# Patient Record
Sex: FEMALE | Race: WHITE | NOT HISPANIC OR LATINO
[De-identification: names, ages, dates, MRNs, and addresses within clinical notes are randomized per-mention and may not be internally consistent; named-entity substitution may affect disease eponyms.]

---

## 2017-02-19 ENCOUNTER — RESULT REVIEW (OUTPATIENT)
Age: 32
End: 2017-02-19

## 2017-02-21 ENCOUNTER — APPOINTMENT (OUTPATIENT)
Dept: FAMILY MEDICINE | Facility: CLINIC | Age: 32
End: 2017-02-21

## 2017-02-21 VITALS
HEIGHT: 66 IN | BODY MASS INDEX: 26.03 KG/M2 | DIASTOLIC BLOOD PRESSURE: 75 MMHG | HEART RATE: 82 BPM | TEMPERATURE: 98.1 F | SYSTOLIC BLOOD PRESSURE: 110 MMHG | WEIGHT: 162 LBS

## 2018-02-22 ENCOUNTER — LABORATORY RESULT (OUTPATIENT)
Age: 33
End: 2018-02-22

## 2018-02-22 ENCOUNTER — APPOINTMENT (OUTPATIENT)
Dept: FAMILY MEDICINE | Facility: CLINIC | Age: 33
End: 2018-02-22
Payer: COMMERCIAL

## 2018-02-22 VITALS
SYSTOLIC BLOOD PRESSURE: 104 MMHG | HEIGHT: 66 IN | BODY MASS INDEX: 25.71 KG/M2 | WEIGHT: 160 LBS | DIASTOLIC BLOOD PRESSURE: 70 MMHG | HEART RATE: 72 BPM

## 2018-02-22 DIAGNOSIS — Z92.29 PERSONAL HISTORY OF OTHER DRUG THERAPY: ICD-10-CM

## 2018-02-22 DIAGNOSIS — Z83.49 FAMILY HISTORY OF OTHER ENDOCRINE, NUTRITIONAL AND METABOLIC DISEASES: ICD-10-CM

## 2018-02-22 DIAGNOSIS — Z86.69 PERSONAL HISTORY OF OTHER DISEASES OF THE NERVOUS SYSTEM AND SENSE ORGANS: ICD-10-CM

## 2018-02-22 PROCEDURE — 36415 COLL VENOUS BLD VENIPUNCTURE: CPT

## 2018-02-22 PROCEDURE — 99395 PREV VISIT EST AGE 18-39: CPT | Mod: 25

## 2018-02-22 PROCEDURE — G0008: CPT

## 2018-02-22 PROCEDURE — 90686 IIV4 VACC NO PRSV 0.5 ML IM: CPT

## 2018-02-22 RX ORDER — POLYMYXIN B SULFATE AND TRIMETHOPRIM 10000; 1 [USP'U]/ML; MG/ML
10000-0.1 SOLUTION OPHTHALMIC
Qty: 1 | Refills: 0 | Status: DISCONTINUED | COMMUNITY
Start: 2017-02-21 | End: 2018-02-22

## 2018-03-06 ENCOUNTER — RESULT REVIEW (OUTPATIENT)
Age: 33
End: 2018-03-06

## 2018-04-03 ENCOUNTER — MESSAGE (OUTPATIENT)
Age: 33
End: 2018-04-03

## 2018-04-17 LAB — TM INTERPRETATION: NORMAL

## 2018-04-25 ENCOUNTER — APPOINTMENT (OUTPATIENT)
Dept: FAMILY MEDICINE | Facility: CLINIC | Age: 33
End: 2018-04-25
Payer: COMMERCIAL

## 2018-04-25 VITALS
WEIGHT: 161 LBS | HEIGHT: 66 IN | HEART RATE: 68 BPM | DIASTOLIC BLOOD PRESSURE: 70 MMHG | SYSTOLIC BLOOD PRESSURE: 124 MMHG | BODY MASS INDEX: 25.88 KG/M2

## 2018-04-25 DIAGNOSIS — E55.9 VITAMIN D DEFICIENCY, UNSPECIFIED: ICD-10-CM

## 2018-04-25 DIAGNOSIS — R79.89 OTHER SPECIFIED ABNORMAL FINDINGS OF BLOOD CHEMISTRY: ICD-10-CM

## 2018-04-25 PROCEDURE — 99213 OFFICE O/P EST LOW 20 MIN: CPT

## 2018-04-28 PROBLEM — E55.9 VITAMIN D INSUFFICIENCY: Status: ACTIVE | Noted: 2018-04-25

## 2018-12-17 ENCOUNTER — OTHER (OUTPATIENT)
Age: 33
End: 2018-12-17

## 2019-02-08 ENCOUNTER — APPOINTMENT (OUTPATIENT)
Dept: FAMILY MEDICINE | Facility: CLINIC | Age: 34
End: 2019-02-08
Payer: COMMERCIAL

## 2019-02-08 VITALS
WEIGHT: 156 LBS | HEIGHT: 66 IN | BODY MASS INDEX: 25.07 KG/M2 | OXYGEN SATURATION: 93 % | DIASTOLIC BLOOD PRESSURE: 70 MMHG | HEART RATE: 102 BPM | TEMPERATURE: 99.4 F | SYSTOLIC BLOOD PRESSURE: 124 MMHG

## 2019-02-08 DIAGNOSIS — Z20.828 CONTACT WITH AND (SUSPECTED) EXPOSURE TO OTHER VIRAL COMMUNICABLE DISEASES: ICD-10-CM

## 2019-02-08 DIAGNOSIS — Z92.29 PERSONAL HISTORY OF OTHER DRUG THERAPY: ICD-10-CM

## 2019-02-08 LAB
FLUAV SPEC QL CULT: NORMAL
FLUBV AG SPEC QL IA: NORMAL

## 2019-02-08 PROCEDURE — 99213 OFFICE O/P EST LOW 20 MIN: CPT | Mod: 25

## 2019-02-08 PROCEDURE — 87804 INFLUENZA ASSAY W/OPTIC: CPT | Mod: QW

## 2019-02-09 PROBLEM — Z20.828 EXPOSURE TO THE FLU: Status: RESOLVED | Noted: 2018-12-17 | Resolved: 2019-02-09

## 2019-02-09 PROBLEM — Z92.29 HISTORY OF INFLUENZA VACCINATION: Status: RESOLVED | Noted: 2018-02-22 | Resolved: 2019-02-09

## 2019-02-09 RX ORDER — OSELTAMIVIR PHOSPHATE 75 MG/1
75 CAPSULE ORAL
Qty: 10 | Refills: 0 | Status: DISCONTINUED | COMMUNITY
Start: 2018-12-17 | End: 2019-02-09

## 2019-02-09 NOTE — ADDENDUM
[FreeTextEntry1] : I, Gabby Zuniga, acted solely as a scribe for Dr. Denise Kennedy on this date 2/8/19.

## 2019-02-09 NOTE — HISTORY OF PRESENT ILLNESS
[FreeTextEntry8] : PATIENT PRESENTING FOR ACUTE VISIT COMPLAINING OF BODY ACHES, CHILLS, CONGESTION, AND COUGH. SYMPTOMS FIRST PRESENTED THIS MORNING. HAD AN ELEVATED TEMPERATURE. HAS NOT TAKEN ANY MEDICATIONS FOR SYMPTOMS. 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 NO VOMITING OR DIARRHEA. FEELING TIRED.  NO CHEST PAIN OR SHORTNESS OF BREATH.  NO OTHER COMPLAINTS TODAY.

## 2019-02-09 NOTE — ASSESSMENT
[FreeTextEntry1] : RAPID FLU: NEGATIVE\par STILL SUSPECT POSSIBLE FLU\par WILL TREAT WITH TAMIFLU - POTENTIAL SIDE EFFECTS REVIEWED\par SUPPORTIVE CARE: REST, FLUIDS, STEAM\par TYLENOL OR MOTRIN PRN AS DIRECTED\par CALL WITH ANY QUESTIONS, CONCERNS OR CHANGES \par DISCUSSED AS SYMPTOMS JUST STARTED, MAY NOT HAVE FULLY PRESENTED SELF AND TO CALL WITH ANY CONCERNS\par FOLLOW-UP FOR CPE AND LAB WORK
Home

## 2019-02-09 NOTE — PHYSICAL EXAM
[Supple] : supple [No Lymphadenopathy] : no lymphadenopathy [No Respiratory Distress] : no respiratory distress  [Clear to Auscultation] : lungs were clear to auscultation bilaterally [No Accessory Muscle Use] : no accessory muscle use [Normal Rate] : normal rate  [Regular Rhythm] : with a regular rhythm [Normal S1, S2] : normal S1 and S2 [No Murmur] : no murmur heard [No Acute Distress] : no acute distress [Well Nourished] : well nourished [Normal Outer Ear/Nose] : the outer ears and nose were normal in appearance [Normal Oropharynx] : the oropharynx was normal [Normal TMs] : both tympanic membranes were normal

## 2019-02-09 NOTE — REVIEW OF SYSTEMS
[Chills] : chills [Fatigue] : fatigue [Postnasal Drip] : postnasal drip [Cough] : cough [Negative] : Gastrointestinal [Nasal Discharge] : nasal discharge [Earache] : no earache [Shortness Of Breath] : no shortness of breath [Wheezing] : no wheezing

## 2019-04-18 ENCOUNTER — RESULT REVIEW (OUTPATIENT)
Age: 34
End: 2019-04-18

## 2019-05-06 ENCOUNTER — OUTPATIENT (OUTPATIENT)
Dept: OUTPATIENT SERVICES | Facility: HOSPITAL | Age: 34
LOS: 1 days | End: 2019-05-06

## 2019-05-06 ENCOUNTER — APPOINTMENT (OUTPATIENT)
Dept: INTERVENTIONAL RADIOLOGY/VASCULAR | Facility: CLINIC | Age: 34
End: 2019-05-06
Payer: COMMERCIAL

## 2019-05-06 DIAGNOSIS — Z00.8 ENCOUNTER FOR OTHER GENERAL EXAMINATION: ICD-10-CM

## 2019-05-06 PROCEDURE — 58340 CATHETER FOR HYSTEROGRAPHY: CPT

## 2019-05-06 PROCEDURE — 74740 X-RAY FEMALE GENITAL TRACT: CPT | Mod: 26

## 2019-08-08 ENCOUNTER — APPOINTMENT (OUTPATIENT)
Dept: FAMILY MEDICINE | Facility: CLINIC | Age: 34
End: 2019-08-08
Payer: COMMERCIAL

## 2019-08-08 VITALS
OXYGEN SATURATION: 98 % | HEIGHT: 66 IN | DIASTOLIC BLOOD PRESSURE: 60 MMHG | BODY MASS INDEX: 22.82 KG/M2 | SYSTOLIC BLOOD PRESSURE: 110 MMHG | WEIGHT: 142 LBS | HEART RATE: 71 BPM

## 2019-08-08 DIAGNOSIS — Z86.19 PERSONAL HISTORY OF OTHER INFECTIOUS AND PARASITIC DISEASES: ICD-10-CM

## 2019-08-08 DIAGNOSIS — Z00.00 ENCOUNTER FOR GENERAL ADULT MEDICAL EXAMINATION W/OUT ABNORMAL FINDINGS: ICD-10-CM

## 2019-08-08 DIAGNOSIS — Z34.90 ENCOUNTER FOR SUPERVISION OF NORMAL PREGNANCY, UNSPECIFIED, UNSPECIFIED TRIMESTER: ICD-10-CM

## 2019-08-08 DIAGNOSIS — R68.83 CHILLS (WITHOUT FEVER): ICD-10-CM

## 2019-08-08 PROCEDURE — 99395 PREV VISIT EST AGE 18-39: CPT

## 2019-08-08 RX ORDER — MULTIVIT WITH CALCIUM,IRON,MIN 27MG-0.4MG
TABLET ORAL
Refills: 0 | Status: DISCONTINUED | COMMUNITY
End: 2019-08-08

## 2019-08-08 RX ORDER — OSELTAMIVIR PHOSPHATE 75 MG/1
75 CAPSULE ORAL TWICE DAILY
Qty: 10 | Refills: 0 | Status: DISCONTINUED | COMMUNITY
Start: 2019-02-08 | End: 2019-08-08

## 2019-08-08 NOTE — HISTORY OF PRESENT ILLNESS
[FreeTextEntry1] : WELLNESS EXAM [de-identified] : MS. TRAMMELL IS A VERY PLEASANT 34 YO PRESENTING FOR ANNUAL WELLNESS EXAM.  FEELING WELL TODAY WITHOUT COMPLAINT.  IS CURRENTLY 8 WEEKS PREGNANT.  FOLLOWING WITH OB/GYN DR. PIPER ROUTINELY.  PLANS TO HAVE FLU VACCINE IN THE FALL.   HAS HAD NO HEADACHE, DIZZINESS, CHEST PAIN, SHORTNESS OF BREATH, GI OR URINARY COMPLAINTS.  NO OTHER COMPLAINTS TODAY.

## 2019-08-08 NOTE — PLAN
[FreeTextEntry1] : CHECK ROUTINE LAB WORK\par FOLLOW-UP WITH OB/GYN AS DIRECTED\par CONTINUE PRENATAL VITAMIN\par FLU VACCINE AND TDAP WITH PREGNANCY RECOMMENDED\par VISION SCREENING\par CALL WITH ANY QUESTIONS, CONCERNS OR CHANGES

## 2019-08-08 NOTE — HEALTH RISK ASSESSMENT
[Good] : ~his/her~  mood as  good [No] : In the past 12 months have you used drugs other than those required for medical reasons? No [No falls in past year] : Patient reported no falls in the past year [0] : 2) Feeling down, depressed, or hopeless: Not at all (0) [HIV test declined] : HIV test declined [Hepatitis C test declined] : Hepatitis C test declined [None] : None [# of Members in Household ___] :  household currently consist of [unfilled] member(s) [With Significant Other] : lives with significant other [Employed] : employed [College] : College [] :  [# Of Children ___] : has [unfilled] children [Sexually Active] : sexually active [Feels Safe at Home] : Feels safe at home [Fully functional (bathing, dressing, toileting, transferring, walking, feeding)] : Fully functional (bathing, dressing, toileting, transferring, walking, feeding) [Fully functional (using the telephone, shopping, preparing meals, housekeeping, doing laundry, using] : Fully functional and needs no help or supervision to perform IADLs (using the telephone, shopping, preparing meals, housekeeping, doing laundry, using transportation, managing medications and managing finances) [Reports normal functional visual acuity (ie: able to read med bottle)] : Reports normal functional visual acuity [Carbon Monoxide Detector] : carbon monoxide detector [Smoke Detector] : smoke detector [Seat Belt] :  uses seat belt [Sunscreen] : uses sunscreen [Safety elements used in home] : safety elements used in home [With Patient/Caregiver] : With Patient/Caregiver [Patient reported PAP Smear was normal] : Patient reported PAP Smear was normal [] : No [de-identified] : REMAINS ACTIVE [de-identified] : HEALTHY DIET [TKE8Arekk] : 0 [Language] : denies difficulty with language [Change in mental status noted] : No change in mental status noted [Learning/Retaining New Information] : denies difficulty learning/retaining new information [Handling Complex Tasks] : denies difficulty handling complex tasks [Reports changes in hearing] : Reports no changes in hearing [High Risk Behavior] : no high risk behavior [Reports changes in vision] : Reports no changes in vision [Reports changes in dental health] : Reports no changes in dental health [PapSmearDate] : 04/19 [de-identified] : GLASSES FOR DRIVING [AdvancecareDate] : 08/19

## 2019-09-12 ENCOUNTER — RESULT REVIEW (OUTPATIENT)
Age: 34
End: 2019-09-12

## 2020-03-14 ENCOUNTER — INPATIENT (INPATIENT)
Facility: HOSPITAL | Age: 35
LOS: 2 days | Discharge: HOME | End: 2020-03-17
Payer: COMMERCIAL

## 2020-03-14 VITALS
RESPIRATION RATE: 15 BRPM | WEIGHT: 175.05 LBS | HEIGHT: 66 IN | SYSTOLIC BLOOD PRESSURE: 132 MMHG | DIASTOLIC BLOOD PRESSURE: 75 MMHG | TEMPERATURE: 98 F | HEART RATE: 69 BPM

## 2020-03-14 DIAGNOSIS — O26.893 OTHER SPECIFIED PREGNANCY RELATED CONDITIONS, THIRD TRIMESTER: ICD-10-CM

## 2020-03-14 DIAGNOSIS — Z3A.39 39 WEEKS GESTATION OF PREGNANCY: ICD-10-CM

## 2020-03-14 LAB
APPEARANCE UR: CLEAR — SIGNIFICANT CHANGE UP
BASOPHILS # BLD AUTO: 0.01 K/UL — SIGNIFICANT CHANGE UP (ref 0–0.2)
BASOPHILS NFR BLD AUTO: 0.1 % — SIGNIFICANT CHANGE UP (ref 0–2)
BILIRUB UR-MCNC: NEGATIVE — SIGNIFICANT CHANGE UP
BLD GP AB SCN SERPL QL: SIGNIFICANT CHANGE UP
COLOR SPEC: YELLOW — SIGNIFICANT CHANGE UP
DIFF PNL FLD: NEGATIVE — SIGNIFICANT CHANGE UP
EOSINOPHIL # BLD AUTO: 0.06 K/UL — SIGNIFICANT CHANGE UP (ref 0–0.5)
EOSINOPHIL NFR BLD AUTO: 0.8 % — SIGNIFICANT CHANGE UP (ref 0–6)
GLUCOSE UR QL: NEGATIVE MG/DL — SIGNIFICANT CHANGE UP
HCT VFR BLD CALC: 35 % — SIGNIFICANT CHANGE UP (ref 34.5–45)
HGB BLD-MCNC: 11.6 G/DL — SIGNIFICANT CHANGE UP (ref 11.5–15.5)
IMM GRANULOCYTES NFR BLD AUTO: 0.7 % — SIGNIFICANT CHANGE UP (ref 0–1.5)
KETONES UR-MCNC: NEGATIVE — SIGNIFICANT CHANGE UP
LEUKOCYTE ESTERASE UR-ACNC: NEGATIVE — SIGNIFICANT CHANGE UP
LYMPHOCYTES # BLD AUTO: 1.43 K/UL — SIGNIFICANT CHANGE UP (ref 1–3.3)
LYMPHOCYTES # BLD AUTO: 19.4 % — SIGNIFICANT CHANGE UP (ref 13–44)
MCHC RBC-ENTMCNC: 31.5 PG — SIGNIFICANT CHANGE UP (ref 27–34)
MCHC RBC-ENTMCNC: 33.1 GM/DL — SIGNIFICANT CHANGE UP (ref 32–36)
MCV RBC AUTO: 95.1 FL — SIGNIFICANT CHANGE UP (ref 80–100)
MONOCYTES # BLD AUTO: 0.58 K/UL — SIGNIFICANT CHANGE UP (ref 0–0.9)
MONOCYTES NFR BLD AUTO: 7.9 % — SIGNIFICANT CHANGE UP (ref 2–14)
NEUTROPHILS # BLD AUTO: 5.23 K/UL — SIGNIFICANT CHANGE UP (ref 1.8–7.4)
NEUTROPHILS NFR BLD AUTO: 71.1 % — SIGNIFICANT CHANGE UP (ref 43–77)
NITRITE UR-MCNC: NEGATIVE — SIGNIFICANT CHANGE UP
PH UR: 6 — SIGNIFICANT CHANGE UP (ref 5–8)
PLATELET # BLD AUTO: 202 K/UL — SIGNIFICANT CHANGE UP (ref 150–400)
PROT UR-MCNC: NEGATIVE MG/DL — SIGNIFICANT CHANGE UP
RBC # BLD: 3.68 M/UL — LOW (ref 3.8–5.2)
RBC # FLD: 13.4 % — SIGNIFICANT CHANGE UP (ref 10.3–14.5)
SP GR SPEC: 1.01 — SIGNIFICANT CHANGE UP (ref 1.01–1.02)
UROBILINOGEN FLD QL: NEGATIVE MG/DL — SIGNIFICANT CHANGE UP
WBC # BLD: 7.36 K/UL — SIGNIFICANT CHANGE UP (ref 3.8–10.5)
WBC # FLD AUTO: 7.36 K/UL — SIGNIFICANT CHANGE UP (ref 3.8–10.5)

## 2020-03-14 RX ORDER — SODIUM CHLORIDE 9 MG/ML
1000 INJECTION, SOLUTION INTRAVENOUS
Refills: 0 | Status: DISCONTINUED | OUTPATIENT
Start: 2020-03-14 | End: 2020-03-15

## 2020-03-14 RX ORDER — OXYTOCIN 10 UNIT/ML
333.33 VIAL (ML) INJECTION
Qty: 20 | Refills: 0 | Status: DISCONTINUED | OUTPATIENT
Start: 2020-03-15 | End: 2020-03-17

## 2020-03-14 RX ORDER — MEPERIDINE HYDROCHLORIDE 50 MG/ML
50 INJECTION INTRAMUSCULAR; INTRAVENOUS; SUBCUTANEOUS ONCE
Refills: 0 | Status: DISCONTINUED | OUTPATIENT
Start: 2020-03-14 | End: 2020-03-15

## 2020-03-14 RX ORDER — CITRIC ACID/SODIUM CITRATE 300-500 MG
30 SOLUTION, ORAL ORAL ONCE
Refills: 0 | Status: COMPLETED | OUTPATIENT
Start: 2020-03-15 | End: 2020-03-15

## 2020-03-14 RX ADMIN — SODIUM CHLORIDE 125 MILLILITER(S): 9 INJECTION, SOLUTION INTRAVENOUS at 19:05

## 2020-03-14 RX ADMIN — SODIUM CHLORIDE 125 MILLILITER(S): 9 INJECTION, SOLUTION INTRAVENOUS at 19:25

## 2020-03-14 NOTE — OB PROVIDER H&P - ASSESSMENT
Patient is a 35yo  at 39 2/7 weeks consistent with LUCERO 3/18/2020 who presents to L&D for elective induction of labor   -Admit to L&D   -GBS negative, no need for abx prophylaxis   -Rubella immune, O+   -Will scan to confirm vertex presentation upon admission Patient is a 35yo  at 39 3/7 weeks consistent with LUCERO 3/18/2020 who presents to L&D for elective induction of labor   -Admit to L&D   -GBS negative, no need for abx prophylaxis   -Rubella immune, O+   -Will scan to confirm vertex presentation upon admission

## 2020-03-14 NOTE — OB PROVIDER H&P - NSANTENATALSTERA_OBGYN_ALL_OB
Please fax this note and my HH order to [facility]. Thanks. Not applicable as gestational age is greater than or equal to 34 weeks.

## 2020-03-14 NOTE — OB PROVIDER H&P - ATTENDING COMMENTS
As above, pt is 34 yr old  at 39+ wks gestation admitted for elective induction of labor.    Cervix FT dilated, soft.  Reviewed R/B/A induction.  Plan for PO Cytotec.

## 2020-03-14 NOTE — OB PROVIDER H&P - HISTORY OF PRESENT ILLNESS
Patient is a 35yo  at 39 2/7 weeks consistent with LUCERO 3/18/2020 who presents to L&D for elective induction of labor   Prenatal course complicated by   1. AMA     PMSH: Denies   ALL: JOHAN Patient is a 35yo  at 39 3/7 weeks consistent with LUCERO 3/18/2020 who presents to L&D for elective induction of labor   Prenatal course complicated by   1. AMA     PMSH: Denies   ALL: JOHAN

## 2020-03-14 NOTE — OB RN PATIENT PROFILE - WEIGHT METHOD
[facility] Observation Department    [address]    [facility] [address]    Phone:  [phone]                                       After Visit Summary   5/31/2017    Jose Larsen    MRN: [id number]           After Visit Summary Signature Page     I have received my discharge instructions, and my questions have been answered. I have discussed any challenges I see with this plan with the nurse or doctor.    ..........................................................................................................................................  Patient/Patient Representative Signature      ..........................................................................................................................................  Patient Representative Print Name and Relationship to Patient    ..................................................               ................................................  Date                                            Time    ..........................................................................................................................................  Reviewed by Signature/Title    ...................................................              ..............................................  Date                                                            Time           stated

## 2020-03-14 NOTE — OB PROVIDER H&P - ALERT: PERTINENT HISTORY
BioPhysical Profile(s)/1st Trimester Sonogram/20 Week Level II Sonogram 20 Week Level II Sonogram/BioPhysical Profile(s)/1st Trimester Sonogram/Follow up Sonogram for Growth

## 2020-03-15 ENCOUNTER — TRANSCRIPTION ENCOUNTER (OUTPATIENT)
Age: 35
End: 2020-03-15

## 2020-03-15 LAB
ABO RH CONFIRMATION: SIGNIFICANT CHANGE UP
T PALLIDUM AB TITR SER: NEGATIVE — SIGNIFICANT CHANGE UP

## 2020-03-15 RX ORDER — OXYCODONE HYDROCHLORIDE 5 MG/1
5 TABLET ORAL ONCE
Refills: 0 | Status: DISCONTINUED | OUTPATIENT
Start: 2020-03-15 | End: 2020-03-17

## 2020-03-15 RX ORDER — OXYTOCIN 10 UNIT/ML
333.33 VIAL (ML) INJECTION
Qty: 20 | Refills: 0 | Status: DISCONTINUED | OUTPATIENT
Start: 2020-03-15 | End: 2020-03-17

## 2020-03-15 RX ORDER — SODIUM CHLORIDE 9 MG/ML
3 INJECTION INTRAMUSCULAR; INTRAVENOUS; SUBCUTANEOUS EVERY 8 HOURS
Refills: 0 | Status: DISCONTINUED | OUTPATIENT
Start: 2020-03-15 | End: 2020-03-17

## 2020-03-15 RX ORDER — AER TRAVELER 0.5 G/1
1 SOLUTION RECTAL; TOPICAL EVERY 4 HOURS
Refills: 0 | Status: DISCONTINUED | OUTPATIENT
Start: 2020-03-15 | End: 2020-03-17

## 2020-03-15 RX ORDER — ACETAMINOPHEN 500 MG
975 TABLET ORAL
Refills: 0 | Status: DISCONTINUED | OUTPATIENT
Start: 2020-03-15 | End: 2020-03-17

## 2020-03-15 RX ORDER — BENZOCAINE 10 %
1 GEL (GRAM) MUCOUS MEMBRANE EVERY 6 HOURS
Refills: 0 | Status: DISCONTINUED | OUTPATIENT
Start: 2020-03-15 | End: 2020-03-17

## 2020-03-15 RX ORDER — HYDROCORTISONE 1 %
1 OINTMENT (GRAM) TOPICAL EVERY 6 HOURS
Refills: 0 | Status: DISCONTINUED | OUTPATIENT
Start: 2020-03-15 | End: 2020-03-17

## 2020-03-15 RX ORDER — IBUPROFEN 200 MG
600 TABLET ORAL EVERY 6 HOURS
Refills: 0 | Status: DISCONTINUED | OUTPATIENT
Start: 2020-03-15 | End: 2020-03-17

## 2020-03-15 RX ORDER — IBUPROFEN 200 MG
600 TABLET ORAL EVERY 6 HOURS
Refills: 0 | Status: COMPLETED | OUTPATIENT
Start: 2020-03-15 | End: 2021-02-11

## 2020-03-15 RX ORDER — LANOLIN
1 OINTMENT (GRAM) TOPICAL EVERY 6 HOURS
Refills: 0 | Status: DISCONTINUED | OUTPATIENT
Start: 2020-03-15 | End: 2020-03-17

## 2020-03-15 RX ORDER — TETANUS TOXOID, REDUCED DIPHTHERIA TOXOID AND ACELLULAR PERTUSSIS VACCINE, ADSORBED 5; 2.5; 8; 8; 2.5 [IU]/.5ML; [IU]/.5ML; UG/.5ML; UG/.5ML; UG/.5ML
0.5 SUSPENSION INTRAMUSCULAR ONCE
Refills: 0 | Status: COMPLETED | OUTPATIENT
Start: 2020-03-15

## 2020-03-15 RX ORDER — DIBUCAINE 1 %
1 OINTMENT (GRAM) RECTAL EVERY 6 HOURS
Refills: 0 | Status: DISCONTINUED | OUTPATIENT
Start: 2020-03-15 | End: 2020-03-17

## 2020-03-15 RX ORDER — PRAMOXINE HYDROCHLORIDE 150 MG/15G
1 AEROSOL, FOAM RECTAL EVERY 4 HOURS
Refills: 0 | Status: DISCONTINUED | OUTPATIENT
Start: 2020-03-15 | End: 2020-03-17

## 2020-03-15 RX ORDER — OXYTOCIN 10 UNIT/ML
4 VIAL (ML) INJECTION
Qty: 30 | Refills: 0 | Status: DISCONTINUED | OUTPATIENT
Start: 2020-03-15 | End: 2020-03-17

## 2020-03-15 RX ORDER — SIMETHICONE 80 MG/1
80 TABLET, CHEWABLE ORAL EVERY 4 HOURS
Refills: 0 | Status: DISCONTINUED | OUTPATIENT
Start: 2020-03-15 | End: 2020-03-17

## 2020-03-15 RX ORDER — KETOROLAC TROMETHAMINE 30 MG/ML
30 SYRINGE (ML) INJECTION ONCE
Refills: 0 | Status: DISCONTINUED | OUTPATIENT
Start: 2020-03-15 | End: 2020-03-15

## 2020-03-15 RX ORDER — OXYCODONE HYDROCHLORIDE 5 MG/1
5 TABLET ORAL
Refills: 0 | Status: DISCONTINUED | OUTPATIENT
Start: 2020-03-15 | End: 2020-03-17

## 2020-03-15 RX ORDER — DIPHENHYDRAMINE HCL 50 MG
25 CAPSULE ORAL EVERY 6 HOURS
Refills: 0 | Status: DISCONTINUED | OUTPATIENT
Start: 2020-03-15 | End: 2020-03-17

## 2020-03-15 RX ADMIN — Medication 4 MILLIUNIT(S)/MIN: at 09:41

## 2020-03-15 RX ADMIN — MEPERIDINE HYDROCHLORIDE 50 MILLIGRAM(S): 50 INJECTION INTRAMUSCULAR; INTRAVENOUS; SUBCUTANEOUS at 00:24

## 2020-03-15 RX ADMIN — MEPERIDINE HYDROCHLORIDE 50 MILLIGRAM(S): 50 INJECTION INTRAMUSCULAR; INTRAVENOUS; SUBCUTANEOUS at 00:09

## 2020-03-15 RX ADMIN — Medication 30 MILLIGRAM(S): at 18:07

## 2020-03-15 RX ADMIN — Medication 30 MILLILITER(S): at 12:59

## 2020-03-15 NOTE — OB RN DELIVERY SUMMARY - NS_SEPSISRSKCALC_OBGYN_ALL_OB_FT
EOS calculated successfully. EOS Risk Factor: 0.5/1000 live births ([facility] national incidence); GA=39w4d; Temp=98.6; ROM=7.65; GBS='Negative'; Antibiotics='No antibiotics or any antibiotics < 2 hrs prior to birth'

## 2020-03-15 NOTE — CHART NOTE - NSCHARTNOTEFT_GEN_A_CORE
Pt is comfortable with epidural anesthesia    Vital Signs Last 24 Hrs  T(C): 36.8 (15 Mar 2020 13:15), Max: 37.0 (15 Mar 2020 07:20)  T(F): 98.24 (15 Mar 2020 13:15), Max: 98.6 (15 Mar 2020 07:20)  HR: 68 (15 Mar 2020 14:27) (58 - 86)  BP: 101/53 (15 Mar 2020 14:27) (95/52 - 143/76)  RR: 18 (15 Mar 2020 13:59) (14 - 18)  SpO2: 94% (15 Mar 2020 13:07) (94% - 98%)    FETAL HEART RATE: 125, moderate variability, + accelerations no decelerations    [place]: contractions every 2 min with pitocin    CERVICAL EXAM: deferred at this time    PAIN SCALE (0-10): adequate epidural anesthesia    IMPRESSION: cat 2 tracing, on Pitocin running at 6/h; the pitocin discontinued, patient turned to her side, 500cc LR bolus, O2 through nonrebreather mask initiated, will reevaluate the tracing Pt is comfortable with epidural anesthesia    Vital Signs Last 24 Hrs  T(C): 36.8 (15 Mar 2020 13:15), Max: 37.0 (15 Mar 2020 07:20)  T(F): 98.24 (15 Mar 2020 13:15), Max: 98.6 (15 Mar 2020 07:20)  HR: 68 (15 Mar 2020 14:27) (58 - 86)  BP: 101/53 (15 Mar 2020 14:27) (95/52 - 143/76)  RR: 18 (15 Mar 2020 13:59) (14 - 18)  SpO2: 94% (15 Mar 2020 13:07) (94% - 98%)    FETAL HEART RATE: 125, moderate variability, + accelerations no decelerations    [place]: contractions every 2 min with pitocin    CERVICAL EXAM: 9.5/90/-1    PAIN SCALE (0-10): adequate epidural anesthesia    IMPRESSION: cat 2 tracing, on Pitocin running at 6/h; the pitocin discontinued, patient turned to her side, 500cc LR bolus, O2 through nonrebreather mask initiated.

## 2020-03-15 NOTE — CHART NOTE - NSCHARTNOTEFT_GEN_A_CORE
03-15-20 @ 13:09  Patient was evaluated at bedside.  Patient currently receiving epidural.   Otherwise, no additional complaints    ICU Vital Signs Last 24 Hrs  T(C): 36.8 (15 Mar 2020 11:58), Max: 37.0 (15 Mar 2020 07:20)  T(F): 98.24 (15 Mar 2020 11:58), Max: 98.6 (15 Mar 2020 07:20)  HR: 77 (15 Mar 2020 12:55) (58 - 77)  BP: 143/76 (15 Mar 2020 12:55) (117/69 - 143/76)  RR: 18 (15 Mar 2020 08:44) (14 - 18)  SpO2: 95% (15 Mar 2020 00:44) (95% - 98%)        FHT: 125 bpm, moderate variability + accels no decels present - Discontinuous at times as patient is currently receiving epidural  [place]: Ctxs every 2-4 minutes.  SVE: Deferred      PLAN:  Reactive tracing  Patient currently receiving epidural for pain management  Will continue with pitocin augmentation   Continuous FHT/[place]  Maternal/fetal status reassuring  Will continue to reassess prn.

## 2020-03-15 NOTE — DISCHARGE NOTE OB - CARE PROVIDER_API CALL
Lyssa Fitch)  Obstetrics and Gynecology  [address] [place] [address]  Phone: [phone]  Fax: [phone]  Follow Up Time: 1 month

## 2020-03-15 NOTE — DISCHARGE NOTE OB - HOSPITAL COURSE
Patient underwent a normal spontaneous vaginal delivery at 39w4d. Delivery required a midline episiotomy, otherwise uncomplicated. Post-partum course was uncomplicated. Pain is well controlled with PRN medication. She has no difficulty with ambulation, voiding, or PO intake. Lab values and vital signs are within normal limits prior to discharge.

## 2020-03-15 NOTE — DISCHARGE NOTE OB - PATIENT PORTAL LINK FT
You can access the FollowMyHealth Patient Portal offered by [facility] by registering at the following website: [email or web address]. By joining [employer]’s FollowMyHealth portal, you will also be able to view your health information using other applications (apps) compatible with our system.

## 2020-03-15 NOTE — DISCHARGE NOTE OB - MEDICATION SUMMARY - MEDICATIONS TO TAKE
I will START or STAY ON the medications listed below when I get home from the hospital:    ibuprofen 600 mg oral tablet  -- 1 tab(s) by mouth every 6 hours   -- Do not take this drug if you are pregnant.  It is very important that you take or use this exactly as directed.  Do not skip doses or discontinue unless directed by your doctor.  May cause drowsiness or dizziness.  Obtain medical advice before taking any non-prescription drugs as some may affect the action of this medication.  Take with food or milk.    -- Indication: For pain    Prenatal Multivitamins with Folic Acid 1 mg oral tablet  -- 1 tab(s) by mouth once a day  -- Indication: For vitamin    senna oral tablet  -- 2 tab(s) by mouth once a day  -- Indication: For constipation    simethicone 80 mg oral tablet, chewable  -- 1 tab(s) by mouth every 4 hours, As needed, Gas  -- Indication: For gas pain

## 2020-03-16 LAB
HCT VFR BLD CALC: 29 % — LOW (ref 34.5–45)
HGB BLD-MCNC: 9.5 G/DL — LOW (ref 11.5–15.5)

## 2020-03-16 RX ORDER — SENNA PLUS 8.6 MG/1
2 TABLET ORAL DAILY
Refills: 0 | Status: DISCONTINUED | OUTPATIENT
Start: 2020-03-16 | End: 2020-03-17

## 2020-03-16 RX ORDER — IBUPROFEN 200 MG
1 TABLET ORAL
Qty: 28 | Refills: 0
Start: 2020-03-16 | End: 2020-03-22

## 2020-03-16 RX ORDER — SENNA PLUS 8.6 MG/1
2 TABLET ORAL
Qty: 0 | Refills: 0 | DISCHARGE
Start: 2020-03-16

## 2020-03-16 RX ORDER — SIMETHICONE 80 MG/1
1 TABLET, CHEWABLE ORAL
Qty: 0 | Refills: 0 | DISCHARGE
Start: 2020-03-16

## 2020-03-16 RX ORDER — TETANUS TOXOID, REDUCED DIPHTHERIA TOXOID AND ACELLULAR PERTUSSIS VACCINE, ADSORBED 5; 2.5; 8; 8; 2.5 [IU]/.5ML; [IU]/.5ML; UG/.5ML; UG/.5ML; UG/.5ML
0.5 SUSPENSION INTRAMUSCULAR ONCE
Refills: 0 | Status: COMPLETED | OUTPATIENT
Start: 2020-03-16 | End: 2020-03-16

## 2020-03-16 RX ADMIN — Medication 975 MILLIGRAM(S): at 22:28

## 2020-03-16 RX ADMIN — SENNA PLUS 2 TABLET(S): 8.6 TABLET ORAL at 18:25

## 2020-03-16 RX ADMIN — Medication 600 MILLIGRAM(S): at 06:44

## 2020-03-16 RX ADMIN — Medication 600 MILLIGRAM(S): at 06:07

## 2020-03-16 RX ADMIN — Medication 1 TABLET(S): at 13:33

## 2020-03-16 RX ADMIN — TETANUS TOXOID, REDUCED DIPHTHERIA TOXOID AND ACELLULAR PERTUSSIS VACCINE, ADSORBED 0.5 MILLILITER(S): 5; 2.5; 8; 8; 2.5 SUSPENSION INTRAMUSCULAR at 21:29

## 2020-03-16 RX ADMIN — Medication 600 MILLIGRAM(S): at 13:33

## 2020-03-16 RX ADMIN — Medication 975 MILLIGRAM(S): at 00:00

## 2020-03-16 RX ADMIN — Medication 975 MILLIGRAM(S): at 10:00

## 2020-03-16 RX ADMIN — Medication 975 MILLIGRAM(S): at 15:52

## 2020-03-16 RX ADMIN — Medication 600 MILLIGRAM(S): at 18:25

## 2020-03-16 RX ADMIN — Medication 600 MILLIGRAM(S): at 00:29

## 2020-03-16 RX ADMIN — Medication 600 MILLIGRAM(S): at 14:30

## 2020-03-16 RX ADMIN — Medication 600 MILLIGRAM(S): at 01:20

## 2020-03-16 RX ADMIN — Medication 975 MILLIGRAM(S): at 21:28

## 2020-03-16 RX ADMIN — Medication 975 MILLIGRAM(S): at 09:09

## 2020-03-16 RX ADMIN — Medication 600 MILLIGRAM(S): at 19:25

## 2020-03-16 NOTE — PROGRESS NOTE ADULT - SUBJECTIVE AND OBJECTIVE BOX
Postpartum Note Vaginal Delivery  Patient is a 33yo  s/p  day 1.    Subjective:  No acute events overnight.   Patient is tolerating diet and denies N/V.   Patient still has moderate vaginal bleeding which is decreasing in amount.   She is breastfeeding and the baby is latching on.    Urinating appropriately.   -BM/+flatus.    Physical exam:  Vital Signs Last 24 Hrs  T(C): 36.7 (15 Mar 2020 21:18), Max: 37.0 (15 Mar 2020 07:20)  T(F): 98.1 (15 Mar 2020 21:18), Max: 98.6 (15 Mar 2020 07:20)  HR: 72 (15 Mar 2020 21:18) (58 - 100)  BP: 121/71 (15 Mar 2020 21:18) (95/52 - 143/76)  RR: 18 (15 Mar 2020 21:18) (18 - 18)  SpO2: 100% (15 Mar 2020 21:18) (91% - 100%)    Heart: RRR  Lungs: CTABL  Breast: non tender, not engorged   Abdomen: Soft, nontender, no distension, firm uterine fundus  Ext: No DVT signs, warm extremities    LABS:                        9.5    x     )-----------( x        ( 16 Mar 2020 06:29 )             29.0

## 2020-03-16 NOTE — PROGRESS NOTE ADULT - ASSESSMENT
A/P  Patient is a 35yo  s/p  day 1  Patient is feeling well and reports no issues.     Continue the current pain medication.   Encourage ambulation and a regular diet.   Discharge according to the normal criteria.

## 2020-03-17 VITALS
SYSTOLIC BLOOD PRESSURE: 127 MMHG | RESPIRATION RATE: 18 BRPM | TEMPERATURE: 98 F | DIASTOLIC BLOOD PRESSURE: 75 MMHG | HEART RATE: 69 BPM

## 2020-03-17 PROCEDURE — 81003 URINALYSIS AUTO W/O SCOPE: CPT

## 2020-03-17 PROCEDURE — 85018 HEMOGLOBIN: CPT

## 2020-03-17 PROCEDURE — 36415 COLL VENOUS BLD VENIPUNCTURE: CPT

## 2020-03-17 PROCEDURE — 85014 HEMATOCRIT: CPT

## 2020-03-17 PROCEDURE — G0463: CPT

## 2020-03-17 PROCEDURE — 86901 BLOOD TYPING SEROLOGIC RH(D): CPT

## 2020-03-17 PROCEDURE — 86850 RBC ANTIBODY SCREEN: CPT

## 2020-03-17 PROCEDURE — 86900 BLOOD TYPING SEROLOGIC ABO: CPT

## 2020-03-17 PROCEDURE — 86780 TREPONEMA PALLIDUM: CPT

## 2020-03-17 PROCEDURE — 59025 FETAL NON-STRESS TEST: CPT

## 2020-03-17 PROCEDURE — 59050 FETAL MONITOR W/REPORT: CPT

## 2020-03-17 PROCEDURE — 85027 COMPLETE CBC AUTOMATED: CPT

## 2020-03-17 PROCEDURE — 90715 TDAP VACCINE 7 YRS/> IM: CPT

## 2020-03-17 RX ADMIN — Medication 600 MILLIGRAM(S): at 06:33

## 2020-03-17 RX ADMIN — Medication 600 MILLIGRAM(S): at 07:30

## 2020-03-17 RX ADMIN — Medication 600 MILLIGRAM(S): at 01:22

## 2020-03-17 RX ADMIN — Medication 975 MILLIGRAM(S): at 08:39

## 2020-03-17 RX ADMIN — Medication 975 MILLIGRAM(S): at 09:30

## 2020-03-17 RX ADMIN — Medication 600 MILLIGRAM(S): at 00:22

## 2020-03-17 NOTE — PROGRESS NOTE ADULT - SUBJECTIVE AND OBJECTIVE BOX
Patient is a 33yo  s/p  day 2.  Delivery complicated by need for episiotomy (midline)     S:    The patient states that the pain is relatively well controlled with Motrin, though she still has pain at the episiotomy site  She is ambulating without difficulty and tolerating PO   + flatus/BM     O:    T(C): 36.3 (20 @ 20:31), Max: 36.9 (20 @ 08:33)  HR: 72 (20 @ 20:31) (72 - 77)  BP: 124/78 (20 @ 20:31) (117/76 - 124/78)  RR: 18 (20 @ 20:31) (18 - 18)  SpO2: 99% (20 @ 20:31) (99% - 99%)    Abdomen:  soft, non-tender, non-distended, +bowel sounds.  Uterus:  Fundus firm below umbilicus  VE:  +lochia  Ext:  Non-tender.                          9.5    x     )-----------( x        ( 16 Mar 2020 06:29 )             29.0

## 2020-03-17 NOTE — PROGRESS NOTE ADULT - ASSESSMENT
Patient is a 33yo  s/p  PPD#2  s/p spontaneous vaginal delivery  -Recovering well   -Voiding, tolerating PO, bowel function nml   -Advance care as tolerated   -Continue routine postpartum/postoperative care and education.  -plans for discharge today pending discharge of baby Patient is a 33yo  s/p  PPD#2  s/p spontaneous vaginal delivery  -Recovering well   -Voiding, tolerating PO, bowel function nml   -Advance care as tolerated   -Continue routine postpartum care and education.  -plans for discharge today pending discharge of baby

## 2020-03-17 NOTE — PROGRESS NOTE ADULT - ATTENDING COMMENTS
As above, pt doing well PPD#1 S/P .  Plan for D/C home tomorrow am pending exam at that time
As above, pt doing well PPD#2 S/P .  Cleared for D/C home.  F/U in office in 6 wks.

## 2020-04-30 ENCOUNTER — RESULT REVIEW (OUTPATIENT)
Age: 35
End: 2020-04-30

## 2021-02-20 ENCOUNTER — OUTPATIENT (OUTPATIENT)
Dept: OUTPATIENT SERVICES | Facility: HOSPITAL | Age: 36
LOS: 1 days | End: 2021-02-20
Payer: COMMERCIAL

## 2021-02-20 ENCOUNTER — TRANSCRIPTION ENCOUNTER (OUTPATIENT)
Age: 36
End: 2021-02-20

## 2021-02-20 DIAGNOSIS — Z20.828 CONTACT WITH AND (SUSPECTED) EXPOSURE TO OTHER VIRAL COMMUNICABLE DISEASES: ICD-10-CM

## 2021-02-20 PROBLEM — Z78.9 OTHER SPECIFIED HEALTH STATUS: Chronic | Status: ACTIVE | Noted: 2020-03-14

## 2021-02-20 LAB — SARS-COV-2 RNA SPEC QL NAA+PROBE: SIGNIFICANT CHANGE UP

## 2021-02-20 PROCEDURE — U0003: CPT

## 2021-02-20 PROCEDURE — U0005: CPT

## 2021-02-20 PROCEDURE — C9803: CPT

## 2021-02-21 DIAGNOSIS — Z20.828 CONTACT WITH AND (SUSPECTED) EXPOSURE TO OTHER VIRAL COMMUNICABLE DISEASES: ICD-10-CM

## 2021-03-24 ENCOUNTER — EMERGENCY (EMERGENCY)
Facility: HOSPITAL | Age: 36
LOS: 1 days | Discharge: HOME | End: 2021-03-24
Payer: COMMERCIAL

## 2021-03-24 VITALS
HEART RATE: 73 BPM | WEIGHT: 125 LBS | OXYGEN SATURATION: 99 % | TEMPERATURE: 100 F | SYSTOLIC BLOOD PRESSURE: 124 MMHG | HEIGHT: 65 IN | RESPIRATION RATE: 16 BRPM | DIASTOLIC BLOOD PRESSURE: 85 MMHG

## 2021-03-24 PROCEDURE — 99282 EMERGENCY DEPT VISIT SF MDM: CPT

## 2021-03-24 NOTE — ED PROVIDER NOTE - PHYSICAL EXAMINATION
Constitutional - well-developed; well nourished. Head - NCAT. Airway patent. Neuro - A&Ox3. normal gait.

## 2021-03-24 NOTE — ED POST DISCHARGE NOTE - RESULT SUMMARY
Notified by charge RN that lab did not receive a label for the COVID-19 test and cannot run the specimen.

## 2021-03-24 NOTE — ED PROVIDER NOTE - PATIENT PORTAL LINK FT
You can access the FollowMyHealth Patient Portal offered by [facility] by registering at the following website: [email or web address]. By joining [facility]’s FollowMyHealth portal, you will also be able to view your health information using other applications (apps) compatible with our system.

## 2021-03-24 NOTE — ED POST DISCHARGE NOTE - DETAILS
Call placed to pt and voicemail left advising pt to call back ED. Pt needs to return for repeat testing advised patient that test was not able to be run due to improper labeling. advised that patient could return to the ER if needing further testing

## 2021-03-25 ENCOUNTER — EMERGENCY (EMERGENCY)
Facility: HOSPITAL | Age: 36
LOS: 1 days | Discharge: HOME | End: 2021-03-25
Payer: COMMERCIAL

## 2021-03-25 VITALS
HEART RATE: 85 BPM | WEIGHT: 125 LBS | TEMPERATURE: 99 F | RESPIRATION RATE: 18 BRPM | DIASTOLIC BLOOD PRESSURE: 76 MMHG | SYSTOLIC BLOOD PRESSURE: 121 MMHG | OXYGEN SATURATION: 98 % | HEIGHT: 65 IN

## 2021-03-25 LAB — SARS-COV-2 RNA SPEC QL NAA+PROBE: DETECTED

## 2021-03-25 PROCEDURE — 99282 EMERGENCY DEPT VISIT SF MDM: CPT

## 2021-03-25 PROCEDURE — 99283 EMERGENCY DEPT VISIT LOW MDM: CPT

## 2021-03-25 PROCEDURE — U0003: CPT

## 2021-03-25 PROCEDURE — U0005: CPT

## 2021-03-25 NOTE — ED PROVIDER NOTE - PATIENT PORTAL LINK FT
You can access the FollowMyHealth Patient Portal offered by [facility] by registering at the following website: [email or web address]. By joining [employer]’s FollowMyHealth portal, you will also be able to view your health information using other applications (apps) compatible with our system.

## 2021-03-25 NOTE — ED ADULT TRIAGE NOTE - CHIEF COMPLAINT QUOTE
c/o needing repeat covid swab, pt was called back for repeat test. reports +exposure at home, son is covid19+. denies symptoms

## 2021-03-25 NOTE — ED PROVIDER NOTE - OBJECTIVE STATEMENT
36 yo female presenting to the ER for COVID-19 testing. Patient asymptomatic. +Exposure. Here yesterday, specimen not labeled. No complaints.

## 2022-07-06 ENCOUNTER — RESULT REVIEW (OUTPATIENT)
Age: 37
End: 2022-07-06

## 2022-10-03 ENCOUNTER — APPOINTMENT (OUTPATIENT)
Dept: ANTEPARTUM | Facility: CLINIC | Age: 37
End: 2022-10-03

## 2022-10-03 ENCOUNTER — ASOB RESULT (OUTPATIENT)
Age: 37
End: 2022-10-03

## 2022-10-03 ENCOUNTER — NON-APPOINTMENT (OUTPATIENT)
Age: 37
End: 2022-10-03

## 2022-10-03 PROCEDURE — 76816 OB US FOLLOW-UP PER FETUS: CPT

## 2022-11-17 NOTE — DISCHARGE NOTE OB - CARE PLAN
Date of Service 11/17/2022    Alisson Castro  Date of Birth 6/4/1955    Patient Age: 75year old    PCP: Alexa Deleon, DO  [address], [address] [place]    Telephone Consult   Type Scan/Screening: PV Screening        Left Carotid Artery: Normal  Right Carotid Artery: Normal   Abdominal aorta: normal     Accessed results prior to call on my chart      Lipid Profile  Cholesterol: 183, done on 9/30/2022. HDL Cholesterol: 91, done on 9/30/2022. LDL Cholesterol: 83, done on 9/30/2022. TriGlycerides 47, done on 9/30/2022. Nurse Review       Recommended Follow Up:  Consult your physician regarding[de-identified] Final Peripheral Vascular Stroke Screen Report    Free PV Screening offered to patient. Recommendations for Change:                       Repeat PV Screening: 3 Years       [place] Recommended Resources:  Recommended Resources: Upcoming Classes, Medical Services and [facility]. Health. Dominic Gonzalez, RN        Please Contact the Nurse Heart Line with any Questions or Concerns [phone]. Principal Discharge DX:	 (normal spontaneous vaginal delivery)  Goal:	rapid recovery  Assessment and plan of treatment:	Patient should transition to regular activity level and resume regular diet. Patient should follow up with her OB for a post-partum checkup 6 weeks after discharge from the hospital. Patient should call her doctor sooner if she develops a fever or uncontrolled vaginal bleeding.

## 2023-01-06 RX ORDER — CITRIC ACID/SODIUM CITRATE 300-500 MG
30 SOLUTION, ORAL ORAL ONCE
Refills: 0 | Status: DISCONTINUED | OUTPATIENT
Start: 2023-01-08 | End: 2023-01-08

## 2023-01-06 RX ORDER — CHLORHEXIDINE GLUCONATE 213 G/1000ML
1 SOLUTION TOPICAL ONCE
Refills: 0 | Status: DISCONTINUED | OUTPATIENT
Start: 2023-01-08 | End: 2023-01-08

## 2023-01-06 RX ORDER — SODIUM CHLORIDE 9 MG/ML
1000 INJECTION, SOLUTION INTRAVENOUS
Refills: 0 | Status: DISCONTINUED | OUTPATIENT
Start: 2023-01-08 | End: 2023-01-08

## 2023-01-06 RX ORDER — OXYTOCIN 10 UNIT/ML
333.33 VIAL (ML) INJECTION
Qty: 20 | Refills: 0 | Status: COMPLETED | OUTPATIENT
Start: 2023-01-08 | End: 2023-01-08

## 2023-01-08 ENCOUNTER — TRANSCRIPTION ENCOUNTER (OUTPATIENT)
Age: 38
End: 2023-01-08

## 2023-01-08 ENCOUNTER — INPATIENT (INPATIENT)
Facility: HOSPITAL | Age: 38
LOS: 0 days | Discharge: HOME | End: 2023-01-09
Payer: COMMERCIAL

## 2023-01-08 VITALS — HEART RATE: 74 BPM | SYSTOLIC BLOOD PRESSURE: 121 MMHG | DIASTOLIC BLOOD PRESSURE: 86 MMHG

## 2023-01-08 LAB
BASOPHILS # BLD AUTO: 0.01 K/UL — SIGNIFICANT CHANGE UP (ref 0–0.2)
BASOPHILS NFR BLD AUTO: 0.2 % — SIGNIFICANT CHANGE UP (ref 0–2)
BLD GP AB SCN SERPL QL: SIGNIFICANT CHANGE UP
COVID-19 SPIKE DOMAIN AB INTERP: POSITIVE
COVID-19 SPIKE DOMAIN ANTIBODY RESULT: >250 U/ML — HIGH
EOSINOPHIL # BLD AUTO: 0.14 K/UL — SIGNIFICANT CHANGE UP (ref 0–0.5)
EOSINOPHIL NFR BLD AUTO: 2.3 % — SIGNIFICANT CHANGE UP (ref 0–6)
HCT VFR BLD CALC: 36.4 % — SIGNIFICANT CHANGE UP (ref 34.5–45)
HGB BLD-MCNC: 12.1 G/DL — SIGNIFICANT CHANGE UP (ref 11.5–15.5)
IMM GRANULOCYTES NFR BLD AUTO: 1.5 % — HIGH (ref 0–0.9)
LYMPHOCYTES # BLD AUTO: 1.58 K/UL — SIGNIFICANT CHANGE UP (ref 1–3.3)
LYMPHOCYTES # BLD AUTO: 25.6 % — SIGNIFICANT CHANGE UP (ref 13–44)
MCHC RBC-ENTMCNC: 30.9 PG — SIGNIFICANT CHANGE UP (ref 27–34)
MCHC RBC-ENTMCNC: 33.2 GM/DL — SIGNIFICANT CHANGE UP (ref 32–36)
MCV RBC AUTO: 93.1 FL — SIGNIFICANT CHANGE UP (ref 80–100)
MONOCYTES # BLD AUTO: 0.39 K/UL — SIGNIFICANT CHANGE UP (ref 0–0.9)
MONOCYTES NFR BLD AUTO: 6.3 % — SIGNIFICANT CHANGE UP (ref 2–14)
NEUTROPHILS # BLD AUTO: 3.97 K/UL — SIGNIFICANT CHANGE UP (ref 1.8–7.4)
NEUTROPHILS NFR BLD AUTO: 64.1 % — SIGNIFICANT CHANGE UP (ref 43–77)
PLATELET # BLD AUTO: 209 K/UL — SIGNIFICANT CHANGE UP (ref 150–400)
RBC # BLD: 3.91 M/UL — SIGNIFICANT CHANGE UP (ref 3.8–5.2)
RBC # FLD: 13.5 % — SIGNIFICANT CHANGE UP (ref 10.3–14.5)
SARS-COV-2 IGG+IGM SERPL QL IA: >250 U/ML — HIGH
SARS-COV-2 IGG+IGM SERPL QL IA: POSITIVE
SARS-COV-2 RNA SPEC QL NAA+PROBE: SIGNIFICANT CHANGE UP
WBC # BLD: 6.18 K/UL — SIGNIFICANT CHANGE UP (ref 3.8–10.5)
WBC # FLD AUTO: 6.18 K/UL — SIGNIFICANT CHANGE UP (ref 3.8–10.5)

## 2023-01-08 RX ORDER — SIMETHICONE 80 MG/1
80 TABLET, CHEWABLE ORAL EVERY 4 HOURS
Refills: 0 | Status: DISCONTINUED | OUTPATIENT
Start: 2023-01-08 | End: 2023-01-09

## 2023-01-08 RX ORDER — OXYCODONE HYDROCHLORIDE 5 MG/1
5 TABLET ORAL ONCE
Refills: 0 | Status: DISCONTINUED | OUTPATIENT
Start: 2023-01-08 | End: 2023-01-09

## 2023-01-08 RX ORDER — PRAMOXINE HYDROCHLORIDE 150 MG/15G
1 AEROSOL, FOAM RECTAL EVERY 4 HOURS
Refills: 0 | Status: DISCONTINUED | OUTPATIENT
Start: 2023-01-08 | End: 2023-01-09

## 2023-01-08 RX ORDER — DIBUCAINE 1 %
1 OINTMENT (GRAM) RECTAL EVERY 6 HOURS
Refills: 0 | Status: DISCONTINUED | OUTPATIENT
Start: 2023-01-08 | End: 2023-01-09

## 2023-01-08 RX ORDER — OXYTOCIN 10 UNIT/ML
41.67 VIAL (ML) INJECTION
Qty: 20 | Refills: 0 | Status: DISCONTINUED | OUTPATIENT
Start: 2023-01-08 | End: 2023-01-09

## 2023-01-08 RX ORDER — OXYCODONE HYDROCHLORIDE 5 MG/1
5 TABLET ORAL
Refills: 0 | Status: DISCONTINUED | OUTPATIENT
Start: 2023-01-08 | End: 2023-01-09

## 2023-01-08 RX ORDER — IBUPROFEN 200 MG
1 TABLET ORAL
Qty: 56 | Refills: 0
Start: 2023-01-08 | End: 2023-01-21

## 2023-01-08 RX ORDER — OXYTOCIN 10 UNIT/ML
2 VIAL (ML) INJECTION
Qty: 30 | Refills: 0 | Status: DISCONTINUED | OUTPATIENT
Start: 2023-01-08 | End: 2023-01-09

## 2023-01-08 RX ORDER — AER TRAVELER 0.5 G/1
1 SOLUTION RECTAL; TOPICAL EVERY 4 HOURS
Refills: 0 | Status: DISCONTINUED | OUTPATIENT
Start: 2023-01-08 | End: 2023-01-09

## 2023-01-08 RX ORDER — KETOROLAC TROMETHAMINE 30 MG/ML
30 SYRINGE (ML) INJECTION ONCE
Refills: 0 | Status: DISCONTINUED | OUTPATIENT
Start: 2023-01-08 | End: 2023-01-08

## 2023-01-08 RX ORDER — IBUPROFEN 200 MG
600 TABLET ORAL EVERY 6 HOURS
Refills: 0 | Status: DISCONTINUED | OUTPATIENT
Start: 2023-01-08 | End: 2023-01-09

## 2023-01-08 RX ORDER — ACETAMINOPHEN 500 MG
3 TABLET ORAL
Qty: 168 | Refills: 0
Start: 2023-01-08 | End: 2023-01-21

## 2023-01-08 RX ORDER — IBUPROFEN 200 MG
600 TABLET ORAL EVERY 6 HOURS
Refills: 0 | Status: COMPLETED | OUTPATIENT
Start: 2023-01-08 | End: 2023-12-07

## 2023-01-08 RX ORDER — HYDROCORTISONE 1 %
1 OINTMENT (GRAM) TOPICAL EVERY 6 HOURS
Refills: 0 | Status: DISCONTINUED | OUTPATIENT
Start: 2023-01-08 | End: 2023-01-09

## 2023-01-08 RX ORDER — LANOLIN
1 OINTMENT (GRAM) TOPICAL EVERY 6 HOURS
Refills: 0 | Status: DISCONTINUED | OUTPATIENT
Start: 2023-01-08 | End: 2023-01-09

## 2023-01-08 RX ORDER — SODIUM CHLORIDE 9 MG/ML
3 INJECTION INTRAMUSCULAR; INTRAVENOUS; SUBCUTANEOUS EVERY 8 HOURS
Refills: 0 | Status: DISCONTINUED | OUTPATIENT
Start: 2023-01-08 | End: 2023-01-09

## 2023-01-08 RX ORDER — DIPHENHYDRAMINE HCL 50 MG
25 CAPSULE ORAL EVERY 6 HOURS
Refills: 0 | Status: DISCONTINUED | OUTPATIENT
Start: 2023-01-08 | End: 2023-01-09

## 2023-01-08 RX ORDER — MAGNESIUM HYDROXIDE 400 MG/1
30 TABLET, CHEWABLE ORAL
Refills: 0 | Status: DISCONTINUED | OUTPATIENT
Start: 2023-01-08 | End: 2023-01-09

## 2023-01-08 RX ORDER — ACETAMINOPHEN 500 MG
975 TABLET ORAL
Refills: 0 | Status: DISCONTINUED | OUTPATIENT
Start: 2023-01-08 | End: 2023-01-09

## 2023-01-08 RX ORDER — TETANUS TOXOID, REDUCED DIPHTHERIA TOXOID AND ACELLULAR PERTUSSIS VACCINE, ADSORBED 5; 2.5; 8; 8; 2.5 [IU]/.5ML; [IU]/.5ML; UG/.5ML; UG/.5ML; UG/.5ML
0.5 SUSPENSION INTRAMUSCULAR ONCE
Refills: 0 | Status: DISCONTINUED | OUTPATIENT
Start: 2023-01-08 | End: 2023-01-09

## 2023-01-08 RX ORDER — BENZOCAINE 10 %
1 GEL (GRAM) MUCOUS MEMBRANE EVERY 6 HOURS
Refills: 0 | Status: DISCONTINUED | OUTPATIENT
Start: 2023-01-08 | End: 2023-01-09

## 2023-01-08 RX ADMIN — Medication 30 MILLIGRAM(S): at 14:50

## 2023-01-08 RX ADMIN — Medication 975 MILLIGRAM(S): at 20:49

## 2023-01-08 RX ADMIN — SODIUM CHLORIDE 125 MILLILITER(S): 9 INJECTION, SOLUTION INTRAVENOUS at 06:37

## 2023-01-08 RX ADMIN — Medication 1000 MILLIUNIT(S)/MIN: at 14:51

## 2023-01-08 RX ADMIN — Medication 2 MILLIUNIT(S)/MIN: at 06:50

## 2023-01-08 RX ADMIN — Medication 975 MILLIGRAM(S): at 20:46

## 2023-01-08 NOTE — OB PROVIDER H&P - NSLOWPPHRISK_OBGYN_A_OB
No previous uterine incision/[place] Pregnancy/Less than or equal to 4 previous vaginal births/No known bleeding disorder/No history of postpartum hemorrhage

## 2023-01-08 NOTE — OB RN PATIENT PROFILE - FALL HARM RISK - UNIVERSAL INTERVENTIONS
Bed in lowest position, wheels locked, appropriate side rails in place/Call bell, personal items and telephone in reach/Instruct patient to call for assistance before getting out of bed or chair/Non-slip footwear when patient is out of bed/[place] to call system/Physically safe environment - no spills, clutter or unnecessary equipment/Purposeful Proactive Rounding/Room/bathroom lighting operational, light cord in reach

## 2023-01-08 NOTE — DISCHARGE NOTE OB - HOSPITAL COURSE
She is a 36yo  who presented at 39w5d GA and had a normal delivery. She had a normal postpartum course and was discharged home in stable condition on postpartum day 1.

## 2023-01-08 NOTE — OB PROVIDER H&P - ASSESSMENT
37y  at 39w5d GA by LMP consistent with 1st trimester sono who presents to L&D for IOL.   -Admit to L&D  -Consent  -Admission labs  -IV fluids  -Labor: Intact. Latent labor. Ez irregulalry. Pitocin induction   -Fetus: Cat I tracing. Continuous toco and fetal monitoring.   -GBS: Negative, no GBS ppx required   -Analgesia: epidural prn    Discussed with Dr. Fitch

## 2023-01-08 NOTE — DISCHARGE NOTE OB - CARE PLAN
Principal Discharge DX:	 (normal spontaneous vaginal delivery)  Assessment and plan of treatment:	Please call your provider in 4-6 weeks to schedule your post partum visit. Take medications as directed, regular diet, activity as tolerated. Exclusive breast feeding for the first 6 months is recommended. Nothing per vagina for 6 weeks (incl. sex, douching, etc). If you have additional concerns, please inform your provider.   1

## 2023-01-08 NOTE — OB PROVIDER IHI INDUCTION/AUGMENTATION NOTE - NS_IHIPITOCINATTEND_OBGYN_ALL_OB_FT
Dev Rasmussen         Last Written Prescription Date: 8/16/17  Last Fill Quantity: 15ml, # refills: 1  Last Office Visit with G, [facility] or [facility] prescribing provider:  8/16/17        BP Readings from Last 3 Encounters:   08/16/17 130/80   04/12/17 120/78   11/30/16 138/88     Lab Results   Component Value Date    MICROL <5 08/16/2017     Lab Results   Component Value Date    UMALCR Unable to calculate due to low value 08/16/2017     Creatinine   Date Value Ref Range Status   04/12/2017 0.84 0.66 - 1.25 mg/dL Final   ]  GFR Estimate   Date Value Ref Range Status   04/12/2017 >90  Non  GFR Calc   >60 mL/min/1.7m2 Final   12/02/2016 >90  Non  GFR Calc   >60 mL/min/1.7m2 Final     GFR Estimate If Black   Date Value Ref Range Status   04/12/2017 >90   GFR Calc   >60 mL/min/1.7m2 Final   12/02/2016 >90   GFR Calc   >60 mL/min/1.7m2 Final     Lab Results   Component Value Date    CHOL 200 09/20/2017     Lab Results   Component Value Date    HDL 41 09/20/2017     Lab Results   Component Value Date    LDL 99 09/20/2017     Lab Results   Component Value Date    TRIG 299 09/20/2017     No results found for: CHOLHDLRATIO  Lab Results   Component Value Date    AST 32 04/12/2017     Lab Results   Component Value Date    ALT 74 04/12/2017     Lab Results   Component Value Date    A1C 7.6 08/16/2017    A1C 8.8 04/12/2017    A1C 7.8 12/02/2016     Potassium   Date Value Ref Range Status   04/12/2017 4.2 3.4 - 5.3 mmol/L Final     AAMIR Cummings LPN     [place]

## 2023-01-08 NOTE — DISCHARGE NOTE OB - MEDICATION SUMMARY - MEDICATIONS TO TAKE
I will START or STAY ON the medications listed below when I get home from the hospital:    acetaminophen 325 mg oral tablet  -- 3 tab(s) by mouth every 6 hours   -- This product contains acetaminophen.  Do not use  with any other product containing acetaminophen to prevent possible liver damage.    -- Indication: For pain    ibuprofen 600 mg oral tablet  -- 1 tab(s) by mouth every 6 hours   -- Do not take this drug if you are pregnant.  It is very important that you take or use this exactly as directed.  Do not skip doses or discontinue unless directed by your doctor.  May cause drowsiness or dizziness.  Obtain medical advice before taking any non-prescription drugs as some may affect the action of this medication.  Take with food or milk.    -- Indication: For pain   I will START or STAY ON the medications listed below when I get home from the hospital:    acetaminophen 325 mg oral tablet  -- 3 tab(s) by mouth every 6 hours   -- This product contains acetaminophen.  Do not use  with any other product containing acetaminophen to prevent possible liver damage.    -- Indication: For pain    ibuprofen 600 mg oral tablet  -- 1 tab(s) by mouth every 6 hours   -- Do not take this drug if you are pregnant.  It is very important that you take or use this exactly as directed.  Do not skip doses or discontinue unless directed by your doctor.  May cause drowsiness or dizziness.  Obtain medical advice before taking any non-prescription drugs as some may affect the action of this medication.  Take with food or milk.    -- Indication: For pain    ferrous sulfate 325 mg (65 mg elemental iron) oral delayed release tablet  -- 1 tab(s) by mouth once a day   -- May discolor urine or feces.  Swallow whole.  Do not crush.    -- Indication: For anemia

## 2023-01-08 NOTE — OB PROVIDER DELIVERY SUMMARY - NSSELHIDDEN_OBGYN_ALL_OB_FT
[NS_DeliveryAttending1_OBGYN_ALL_OB_FT:[id number]==],[NS_DeliveryAssist1_OBGYN_ALL_OB_FT:[id number]=],[NS_DeliveryRN_OBGYN_ALL_OB_FT:[id number]=]

## 2023-01-08 NOTE — DISCHARGE NOTE OB - NS MD DC FALL RISK RISK
For information on Fall & Injury Prevention, visit: [email or web address] OR  [email or web address] OR  https://www.cdc.gov/steadi/patient.html

## 2023-01-08 NOTE — DISCHARGE NOTE OB - CARE PROVIDER_API CALL
Lyssa Fitch)  Obstetrics and Gynecology  [address] [place] [address]  Phone: [phone]  Fax: [phone]  Established Patient  Follow Up Time: 1 month

## 2023-01-08 NOTE — OB RN DELIVERY SUMMARY - NSSELHIDDEN_OBGYN_ALL_OB_FT
[NS_DeliveryAttending1_OBGYN_ALL_OB_FT:[id number]==],[NS_DeliveryAssist1_OBGYN_ALL_OB_FT:[id number]=],[NS_DeliveryRN_OBGYN_ALL_OB_FT:[id number]=]

## 2023-01-08 NOTE — OB RN DELIVERY SUMMARY - NS_SEPSISRSKCALC_OBGYN_ALL_OB_FT
EOS calculated successfully. EOS Risk Factor: 0.5/1000 live births ([facility] national incidence); GA=39w5d; Temp=97.9; ROM=5.267; GBS='Negative'; Antibiotics='No antibiotics or any antibiotics < 2 hrs prior to birth'

## 2023-01-08 NOTE — OB PROVIDER H&P - NSHPPHYSICALEXAM_GEN_ALL_CORE
T(C): 36.6 (01-08-23 @ 06:04), Max: 36.6 (01-08-23 @ 06:04)  HR: 74 (01-08-23 @ 05:47) (74 - 74)  BP: 121/86 (01-08-23 @ 05:47) (121/86 - 121/86)  SpO2: 98% (01-08-23 @ 06:04) (98% - 98%)    Gen: NAD, well-appearing, AAOx3   Abd: Soft, gravid  Ext: non-tender, non-edematous    Bedside sono: vertex, anterior placenta  FHT: 125/moderate variability/+accels/-decels  [place]: irregular

## 2023-01-08 NOTE — OB PROVIDER H&P - NSSCHADMISSION_OBGYN_A_OB
There are no preventive care reminders to display for this patient.    Patient is up to date, no discussion needed.    Preferred method of results communication:     Cell Phone:   Telephone Information:   Mobile [phone]     Okay to leave a message containing results? Yes                       Yes

## 2023-01-08 NOTE — OB PROVIDER DELIVERY SUMMARY - NSPROVIDERDELIVERYNOTE_OBGYN_ALL_OB_FT
Patient found to be fully dilated feeling rectal pressure and urge to push after epidural placement. Infant's head progressed to perineum with maternal efforts. Given difficulty with further advancement with full maternal effort and tight band, decision made to perform midline episiotomy.  at 1406 of a live female infant with Apgars 9/9. Delivered OA with maternal expulsive efforts, loose nuchal cord x1 reduced at perineum. Nose and mouth bulb suctioned at perineum. Shoulders delivered without difficulty followed by the rest of the body. Baby handed to patient. Cord clamped and cut after delay. Blood for cord banking obtained. Placenta delivered spontaneously, intact at 1413. Pitocin started. Excellent hemostasis achieved. Cervix, perineum and vagina were inspected. Midline episiotomy repaired with Chromic. EBL 62cc. Pt tolerated procedure well, in stable condition, recovering in LDR. Infant in LDR. Instrument/sponge count correct x 2 and confirmed by nurse.

## 2023-01-08 NOTE — DISCHARGE NOTE OB - PATIENT PORTAL LINK FT
You can access the FollowMyHealth Patient Portal offered by [facility] by registering at the following website: [email or web address]. By joining [employer]’s FollowMyHealth portal, you will also be able to view your health information using other applications (apps) compatible with our system.

## 2023-01-08 NOTE — OB PROVIDER H&P - HISTORY OF PRESENT ILLNESS
37y  at 39w5d GA by LMP consistent with 1st trimester gonzales who presents to L&D for IOL. She endorses irregular contractions. She denies vaginal bleeding, leakage of fluid. She endorses good fetal movement. She enies fevers, chills, nausea, vomiting, chest pain, SOB, dizziness and headache. No other complaints at this time.     LUCERO: 1/10/23  LMP: 22    Prenatal course is significant for:  AMA    POB: G1 (3/15/2020)  @39w, male, 7lb 8oz  PGYN: -fibroids, -ovarian cysts, denies STD hx, denies abnormal PAPs   PMH: Denies  PSH: Denies  SH: Denies EtOH, tobacco and illicit drug use during this pregnancy; feels safe at home   Meds: PNVs  Allergies: NKDA

## 2023-01-09 VITALS
RESPIRATION RATE: 18 BRPM | SYSTOLIC BLOOD PRESSURE: 129 MMHG | OXYGEN SATURATION: 98 % | HEART RATE: 72 BPM | DIASTOLIC BLOOD PRESSURE: 79 MMHG | TEMPERATURE: 98 F

## 2023-01-09 LAB
HCT VFR BLD CALC: 29 % — LOW (ref 34.5–45)
HGB BLD-MCNC: 9.6 G/DL — LOW (ref 11.5–15.5)
T PALLIDUM AB TITR SER: NEGATIVE — SIGNIFICANT CHANGE UP

## 2023-01-09 PROCEDURE — 85025 COMPLETE CBC W/AUTO DIFF WBC: CPT

## 2023-01-09 PROCEDURE — 86901 BLOOD TYPING SEROLOGIC RH(D): CPT

## 2023-01-09 PROCEDURE — 36415 COLL VENOUS BLD VENIPUNCTURE: CPT

## 2023-01-09 PROCEDURE — U0005: CPT

## 2023-01-09 PROCEDURE — 86900 BLOOD TYPING SEROLOGIC ABO: CPT

## 2023-01-09 PROCEDURE — 85018 HEMOGLOBIN: CPT

## 2023-01-09 PROCEDURE — 85014 HEMATOCRIT: CPT

## 2023-01-09 PROCEDURE — 86850 RBC ANTIBODY SCREEN: CPT

## 2023-01-09 PROCEDURE — 86769 SARS-COV-2 COVID-19 ANTIBODY: CPT

## 2023-01-09 PROCEDURE — U0003: CPT

## 2023-01-09 PROCEDURE — 86780 TREPONEMA PALLIDUM: CPT

## 2023-01-09 RX ORDER — ACETAMINOPHEN 500 MG
3 TABLET ORAL
Qty: 168 | Refills: 0
Start: 2023-01-09 | End: 2023-01-22

## 2023-01-09 RX ORDER — FERROUS SULFATE 325(65) MG
1 TABLET ORAL
Qty: 30 | Refills: 0
Start: 2023-01-09 | End: 2023-02-07

## 2023-01-09 RX ORDER — IBUPROFEN 200 MG
1 TABLET ORAL
Qty: 56 | Refills: 0
Start: 2023-01-09 | End: 2023-01-22

## 2023-01-09 RX ORDER — FERROUS SULFATE 325(65) MG
325 TABLET ORAL DAILY
Refills: 0 | Status: DISCONTINUED | OUTPATIENT
Start: 2023-01-09 | End: 2023-01-09

## 2023-01-09 RX ADMIN — Medication 975 MILLIGRAM(S): at 09:57

## 2023-01-09 RX ADMIN — Medication 600 MILLIGRAM(S): at 05:09

## 2023-01-09 RX ADMIN — Medication 1 TABLET(S): at 12:26

## 2023-01-09 RX ADMIN — Medication 600 MILLIGRAM(S): at 13:07

## 2023-01-09 RX ADMIN — Medication 325 MILLIGRAM(S): at 12:25

## 2023-01-09 RX ADMIN — Medication 600 MILLIGRAM(S): at 05:05

## 2023-01-09 RX ADMIN — Medication 600 MILLIGRAM(S): at 17:24

## 2023-01-09 RX ADMIN — Medication 600 MILLIGRAM(S): at 12:25

## 2023-01-09 RX ADMIN — Medication 975 MILLIGRAM(S): at 09:30

## 2023-01-09 RX ADMIN — Medication 975 MILLIGRAM(S): at 16:09

## 2023-01-09 NOTE — PROGRESS NOTE ADULT - ATTENDING COMMENTS
37y  now PPD#1 s/p spontaneous vaginal delivery at 39w5d, presented in labor  - post partum Hgb 9.6, consistent with acute blood loss anemia, now with minimal lochia, no symptoms of anemia   - healthy female infant at bedside   - vital signs, lab results, physical exam reassuring   - cleared for discharge today or tomorrow

## 2023-01-09 NOTE — PROGRESS NOTE ADULT - ASSESSMENT
SYD TRAMMELL is a 37y  now PPD#1 s/p spontaneous vaginal delivery at 39w5d, presented in labor  -Vital signs stable  -Hgb: 12.1 -> AM labs pending   -Voiding, tolerating PO  -Advance care as tolerated   -Continue routine postpartum care and education  -Healthy female infant  -Dispo: Meeting all postpartum milestones. Anticipate discharge to home pending attending approval.     SYD TRAMMELL is a 37y  now PPD#1 s/p spontaneous vaginal delivery at 39w5d, presented in labor    -Vital signs stable  -Hgb: 12.1 -> AM labs pending   -Voiding, tolerating PO  -Advance care as tolerated   -Continue routine postpartum care and education  -Healthy female infant  -Dispo: Meeting all postpartum milestones. Anticipate discharge to home pending attending approval.

## 2023-01-09 NOTE — PROGRESS NOTE ADULT - SUBJECTIVE AND OBJECTIVE BOX
Patient seen, sitting up in bed, denies any fever, backache, headache, difficulty ambulating.
SYD TRAMMELL is a 37y  now PPD#1 s/p spontaneous vaginal delivery at 39w5d, presented in labor    S:    No acute events overnight.   The patient has no complaints.  Pain controlled with current treatment regimen.   She is ambulating without difficulty and tolerating PO.   +flatus/-BM/+ voiding   She endorses appropriate lochia, which is decreasing.   She is breastfeeding without difficulty.   She denies fevers, chills, nausea and vomiting.   She denies lightheadedness, dizziness, palpitations, chest pain and SOB.     O:    T(C): 36.5 (23 @ 05:07), Max: 37.1 (23 @ 15:50)  HR: 63 (23 @ 05:07) (63 - 111)  BP: 123/81 (23 @ 05:07) (98/57 - 143/58)  RR: 18 (23 @ 05:07) (18 - 18)  SpO2: 98% (23 @ 05:07) (84% - 100%)    Gen: NAD, AOx3  CV: RRR, S1/S2 present  Pulm: CTAB  Abdomen:  Soft, non-tender, non-distended, +bowel sounds  Uterus:  Fundus firm below umbilicus  Ext:  b/l LE non-tender                           12.1   6.18  )-----------( 209      ( 2023 06:20 )             36.4

## 2023-06-07 NOTE — OB RN PATIENT PROFILE - PRO PRENATAL LABS ORI SOURCE HIV
What Is The Reason For Today's Visit?: Full Body Skin Examination hard copy, drawn during this pregnancy

## 2024-10-01 ENCOUNTER — APPOINTMENT (OUTPATIENT)
Dept: MAMMOGRAPHY | Facility: CLINIC | Age: 39
End: 2024-10-01

## 2024-10-01 ENCOUNTER — OUTPATIENT (OUTPATIENT)
Dept: OUTPATIENT SERVICES | Facility: HOSPITAL | Age: 39
LOS: 1 days | End: 2024-10-01
Payer: COMMERCIAL

## 2024-10-01 DIAGNOSIS — Z12.31 ENCOUNTER FOR SCREENING MAMMOGRAM FOR MALIGNANT NEOPLASM OF BREAST: ICD-10-CM

## 2024-10-01 PROCEDURE — 77067 SCR MAMMO BI INCL CAD: CPT | Mod: 26

## 2024-10-01 PROCEDURE — 77063 BREAST TOMOSYNTHESIS BI: CPT | Mod: 26

## 2024-10-01 PROCEDURE — 77063 BREAST TOMOSYNTHESIS BI: CPT

## 2024-10-01 PROCEDURE — 77067 SCR MAMMO BI INCL CAD: CPT

## 2025-05-06 ENCOUNTER — APPOINTMENT (OUTPATIENT)
Dept: MRI IMAGING | Facility: CLINIC | Age: 40
End: 2025-05-06
Payer: COMMERCIAL

## 2025-05-06 ENCOUNTER — OUTPATIENT (OUTPATIENT)
Dept: OUTPATIENT SERVICES | Facility: HOSPITAL | Age: 40
LOS: 1 days | End: 2025-05-06
Payer: COMMERCIAL

## 2025-05-06 DIAGNOSIS — Z00.8 ENCOUNTER FOR OTHER GENERAL EXAMINATION: ICD-10-CM

## 2025-05-06 PROCEDURE — 77049 MRI BREAST C-+ W/CAD BI: CPT | Mod: 26

## 2025-05-06 PROCEDURE — C8937: CPT

## 2025-05-06 PROCEDURE — C8908: CPT

## 2025-05-06 PROCEDURE — A9585: CPT
